# Patient Record
Sex: MALE | Race: BLACK OR AFRICAN AMERICAN | URBAN - METROPOLITAN AREA
[De-identification: names, ages, dates, MRNs, and addresses within clinical notes are randomized per-mention and may not be internally consistent; named-entity substitution may affect disease eponyms.]

---

## 2021-10-25 ENCOUNTER — OFFICE VISIT (OUTPATIENT)
Dept: FAMILY MEDICINE CLINIC | Age: 21
End: 2021-10-25
Payer: COMMERCIAL

## 2021-10-25 VITALS
BODY MASS INDEX: 22.51 KG/M2 | RESPIRATION RATE: 17 BRPM | HEART RATE: 63 BPM | SYSTOLIC BLOOD PRESSURE: 120 MMHG | DIASTOLIC BLOOD PRESSURE: 78 MMHG | HEIGHT: 69 IN | OXYGEN SATURATION: 94 % | TEMPERATURE: 97.9 F | WEIGHT: 152 LBS

## 2021-10-25 DIAGNOSIS — F33.1 MODERATE EPISODE OF RECURRENT MAJOR DEPRESSIVE DISORDER (HCC): ICD-10-CM

## 2021-10-25 DIAGNOSIS — Z28.21 VACCINATION NOT CARRIED OUT BECAUSE OF PATIENT REFUSAL: ICD-10-CM

## 2021-10-25 DIAGNOSIS — Z00.00 ROUTINE HEALTH MAINTENANCE: Primary | ICD-10-CM

## 2021-10-25 PROCEDURE — 99203 OFFICE O/P NEW LOW 30 MIN: CPT | Performed by: STUDENT IN AN ORGANIZED HEALTH CARE EDUCATION/TRAINING PROGRAM

## 2021-10-25 RX ORDER — FLUOXETINE 10 MG/1
10 CAPSULE ORAL DAILY
Qty: 30 CAPSULE | Refills: 3 | Status: SHIPPED | OUTPATIENT
Start: 2021-10-25

## 2021-10-25 SDOH — ECONOMIC STABILITY: FOOD INSECURITY: WITHIN THE PAST 12 MONTHS, YOU WORRIED THAT YOUR FOOD WOULD RUN OUT BEFORE YOU GOT MONEY TO BUY MORE.: NEVER TRUE

## 2021-10-25 SDOH — ECONOMIC STABILITY: FOOD INSECURITY: WITHIN THE PAST 12 MONTHS, THE FOOD YOU BOUGHT JUST DIDN'T LAST AND YOU DIDN'T HAVE MONEY TO GET MORE.: NEVER TRUE

## 2021-10-25 ASSESSMENT — PATIENT HEALTH QUESTIONNAIRE - PHQ9
SUM OF ALL RESPONSES TO PHQ QUESTIONS 1-9: 18
3. TROUBLE FALLING OR STAYING ASLEEP: 3
5. POOR APPETITE OR OVEREATING: 3
7. TROUBLE CONCENTRATING ON THINGS, SUCH AS READING THE NEWSPAPER OR WATCHING TELEVISION: 3
8. MOVING OR SPEAKING SO SLOWLY THAT OTHER PEOPLE COULD HAVE NOTICED. OR THE OPPOSITE, BEING SO FIGETY OR RESTLESS THAT YOU HAVE BEEN MOVING AROUND A LOT MORE THAN USUAL: 1
6. FEELING BAD ABOUT YOURSELF - OR THAT YOU ARE A FAILURE OR HAVE LET YOURSELF OR YOUR FAMILY DOWN: 1
SUM OF ALL RESPONSES TO PHQ QUESTIONS 1-9: 18
4. FEELING TIRED OR HAVING LITTLE ENERGY: 3
9. THOUGHTS THAT YOU WOULD BE BETTER OFF DEAD, OR OF HURTING YOURSELF: 0
SUM OF ALL RESPONSES TO PHQ9 QUESTIONS 1 & 2: 4
SUM OF ALL RESPONSES TO PHQ QUESTIONS 1-9: 18
1. LITTLE INTEREST OR PLEASURE IN DOING THINGS: 2
2. FEELING DOWN, DEPRESSED OR HOPELESS: 2

## 2021-10-25 ASSESSMENT — ENCOUNTER SYMPTOMS
CONSTIPATION: 0
ABDOMINAL PAIN: 0
COUGH: 0
CHEST TIGHTNESS: 0
RHINORRHEA: 0
BACK PAIN: 0
SHORTNESS OF BREATH: 0
EYE PAIN: 0
NAUSEA: 0
DIARRHEA: 0
VOMITING: 0

## 2021-10-25 ASSESSMENT — SOCIAL DETERMINANTS OF HEALTH (SDOH): HOW HARD IS IT FOR YOU TO PAY FOR THE VERY BASICS LIKE FOOD, HOUSING, MEDICAL CARE, AND HEATING?: NOT HARD AT ALL

## 2021-10-25 NOTE — PROGRESS NOTES
10/25/2021    Jessica Sanchez (:  2000) is a 19 y.o. male, here for evaluation of the following medical concerns:    HPI  Chief Complaint   Patient presents with    New Patient     establish care    Anxiety     states last 3 months its been an issue     Patient is a 19year old male here today to establish care. He has a PMH of asthma. He has no known drug allergies. He has no surgeries. He only uses an inhaler as needed for his asthma. He states that HTN, hyperlipidemia, and diabetes runs in his family. He states that his sister has history of depression. Patient does not smoke but he does drink. States he used to drink a lot more than he does now. He states that he will drink about 2 times a week but does not always finish his drinks when he starts. Jessica Sanchez is a 19 y.o. male who presents for new evaluation and treatment of depression. Onset was approximately 1 year ago. Symptoms have been gradually worsening since that time. Current symptoms include: depressed mood, difficulty concentrating, fatigue, feelings of worthlessness/guilt, hopelessness, hypersomnia, insomnia and suicidal thoughts with specific plan. Family history significant for depression. Possible organic causes contributing are: none. Risk factors: positive family history in  sister(s). Previous treatment includes none. He complains of the following side effects from the treatment: none. He attempted suicide twice in the past in 2019. He states that he has had thoughts about what he could do but states that he would not actually go through it. He does not have great support because they are in [place]. Review of Systems   Constitutional: Negative for chills, fatigue and fever. HENT: Negative for congestion, ear pain, postnasal drip and rhinorrhea. Eyes: Negative for pain. Respiratory: Negative for cough, chest tightness and shortness of breath. Cardiovascular: Negative for chest pain. Gastrointestinal: Negative for abdominal pain, constipation, diarrhea, nausea and vomiting. Genitourinary: Negative for difficulty urinating, dysuria and frequency. Musculoskeletal: Negative for back pain and myalgias. Skin: Negative for rash. Neurological: Negative for dizziness, light-headedness, numbness and headaches. Psychiatric/Behavioral: Positive for decreased concentration, dysphoric mood, sleep disturbance and suicidal ideas. The patient is nervous/anxious. Prior to Visit Medications    Medication Sig Taking? Authorizing Provider   FLUoxetine (PROZAC) 10 MG capsule Take 1 capsule by mouth daily Yes Miroslava Oliveira DO        No Known Allergies     Past Medical History:   Diagnosis Date    Asthma        History reviewed. No pertinent surgical history. Social History     Socioeconomic History    Marital status: Single     Spouse name: Not on file    Number of children: Not on file    Years of education: Not on file    Highest education level: Not on file   Occupational History    Not on file   Tobacco Use    Smoking status: Never Smoker    Smokeless tobacco: Former User   Vaping Use    Vaping Use: Former   Substance and Sexual Activity    Alcohol use: Yes    Drug use: Not on file    Sexual activity: Not on file   Other Topics Concern    Not on file   Social History Narrative    Not on file     Social Determinants of Health     Financial Resource Strain: Low Risk     Difficulty of Paying Living Expenses: Not hard at all   Food Insecurity: No Food Insecurity    Worried About Running Out of Food in the Last Year: Never true    [address] in the Last Year: Never true   Transportation Needs:     Lack of Transportation (Medical):      Lack of Transportation (Non-Medical):    Physical Activity:     Days of Exercise per Week:     Minutes of Exercise per Session:    Stress:     Feeling of Stress :    Social Connections:     Frequency of Communication with Friends and Family:     Frequency of Social Gatherings with Friends and Family:     Attends Taoism Services:     Active Member of Clubs or Organizations:     Attends Club or Organization Meetings:     Marital Status:    Intimate Partner Violence:     Fear of Current or Ex-Partner:     Emotionally Abused:     Physically Abused:     Sexually Abused:         Family History   Problem Relation Age of Onset    High Blood Pressure Mother     High Blood Pressure Maternal Grandmother     High Cholesterol Maternal Grandmother     Diabetes Maternal Grandfather     High Cholesterol Maternal Grandfather            Vitals:    10/25/21 1311   BP: 120/78   Site: Left Upper Arm   Position: Sitting   Cuff Size: Medium Adult   Pulse: 63   Resp: 17   Temp: 97.9 °F (36.6 °C)   TempSrc: Temporal   SpO2: 94%   Weight: 152 lb (68.9 kg)   Height: 5' 9\" (1.753 m)     Estimated body mass index is 22.45 kg/m² as calculated from the following:    Height as of this encounter: 5' 9\" (1.753 m). Weight as of this encounter: 152 lb (68.9 kg). Physical Exam  Vitals and nursing note reviewed. Constitutional:       Appearance: Normal appearance. HENT:      Head: Normocephalic and atraumatic. Right Ear: Tympanic membrane, ear canal and external ear normal.      Left Ear: Tympanic membrane, ear canal and external ear normal.      Nose: Nose normal.      Mouth/Throat:      Mouth: Mucous membranes are moist.   Eyes:      Extraocular Movements: Extraocular movements intact. Pupils: Pupils are equal, round, and reactive to light. Cardiovascular:      Rate and Rhythm: Normal rate and regular rhythm. Pulses: Normal pulses. Pulmonary:      Breath sounds: Normal breath sounds. Abdominal:      General: Bowel sounds are normal.      Palpations: Abdomen is soft. Musculoskeletal:         General: Normal range of motion. Cervical back: Normal range of motion and neck supple. Skin:     General: Skin is warm and dry. Capillary Refill: Capillary refill takes less than 2 seconds. Neurological:      General: No focal deficit present. Mental Status: He is alert and oriented to person, place, and time. Psychiatric:         Mood and Affect: Mood is anxious and depressed. Affect is flat. Behavior: Behavior is withdrawn. Thought Content: Thought content includes suicidal ideation. ASSESSMENT/PLAN:  Kristen Azul was seen today for new patient and anxiety. Diagnoses and all orders for this visit:    Routine health maintenance    Moderate episode of recurrent major depressive disorder (HCC)  -     FLUoxetine (PROZAC) 10 MG capsule; Take 1 capsule by mouth daily  -     External Referral To Psychiatry    Vaccination not carried out because of patient refusal     patient states that he has suicidal thoughts but not actively suicidal at this time. States that he has had the thoughts but would never go through with it. Medication will be prescribed and referral for therapy placed. Educational materials and/or home exercises printed for patient's review and were included in patient instructions on his/her After Visit Summary and given to patient at the end of visit. Counseled regarding above diagnosis, including possible risks and complications,  especially if left uncontrolled. Counseled regarding the possible side effects, risks, benefits and alternatives to treatment; patient and/or guardian verbalizes understanding, agrees, feels comfortable with and wishes to proceed with above treatment plan. Advised patient to call with any new medication issues, and read all Rx info from pharmacy to assure aware of all possible risks and side effects of medication before taking. Reviewed age and gender appropriate health screening exams and vaccinations.   Advised patient regarding importance of keeping up with recommended health maintenance and to schedule as soon as possible if overdue, as this is important in assessing for undiagnosed pathology, especially cancer, as well as protecting against potentially harmful/life threatening disease. Patient and/or guardian verbalizes understanding and agrees with above counseling, assessment and plan. All questions answered. Return in about 4 weeks (around 11/22/2021) for anxiety, depression. An  electronic signature was used to authenticate this note.     --Kishore Burr, DO on 10/25/2021 at 2:52 PM

## 2021-11-10 ENCOUNTER — OFFICE VISIT (OUTPATIENT)
Dept: FAMILY MEDICINE CLINIC | Age: 21
End: 2021-11-10
Payer: COMMERCIAL

## 2021-11-10 VITALS
TEMPERATURE: 100.7 F | HEIGHT: 69 IN | HEART RATE: 107 BPM | WEIGHT: 153 LBS | OXYGEN SATURATION: 95 % | SYSTOLIC BLOOD PRESSURE: 124 MMHG | DIASTOLIC BLOOD PRESSURE: 78 MMHG | BODY MASS INDEX: 22.66 KG/M2

## 2021-11-10 DIAGNOSIS — J45.41 MODERATE PERSISTENT ASTHMATIC BRONCHITIS WITH ACUTE EXACERBATION: ICD-10-CM

## 2021-11-10 DIAGNOSIS — R09.81 HEAD CONGESTION: ICD-10-CM

## 2021-11-10 DIAGNOSIS — R05.9 COUGH: ICD-10-CM

## 2021-11-10 DIAGNOSIS — R50.9 FEVER, UNSPECIFIED FEVER CAUSE: Primary | ICD-10-CM

## 2021-11-10 DIAGNOSIS — J01.40 ACUTE NON-RECURRENT PANSINUSITIS: ICD-10-CM

## 2021-11-10 LAB
Lab: NORMAL
PERFORMING INSTRUMENT: NORMAL
QC PASS/FAIL: NORMAL
SARS-COV-2, POC: NORMAL

## 2021-11-10 PROCEDURE — 99214 OFFICE O/P EST MOD 30 MIN: CPT | Performed by: FAMILY MEDICINE

## 2021-11-10 PROCEDURE — 87426 SARSCOV CORONAVIRUS AG IA: CPT | Performed by: FAMILY MEDICINE

## 2021-11-10 RX ORDER — AMOXICILLIN AND CLAVULANATE POTASSIUM 875; 125 MG/1; MG/1
1 TABLET, FILM COATED ORAL 2 TIMES DAILY
Qty: 20 TABLET | Refills: 0 | Status: SHIPPED | OUTPATIENT
Start: 2021-11-10 | End: 2021-11-20

## 2021-11-10 RX ORDER — METHYLPREDNISOLONE 4 MG/1
TABLET ORAL
Qty: 1 KIT | Refills: 0 | Status: SHIPPED | OUTPATIENT
Start: 2021-11-10 | End: 2021-11-16

## 2021-11-10 RX ORDER — BENZONATATE 200 MG/1
200 CAPSULE ORAL 3 TIMES DAILY PRN
Qty: 30 CAPSULE | Refills: 0 | Status: SHIPPED | OUTPATIENT
Start: 2021-11-10 | End: 2021-11-17

## 2021-11-10 ASSESSMENT — ENCOUNTER SYMPTOMS
BLOOD IN STOOL: 0
WHEEZING: 1
COUGH: 1
SINUS PRESSURE: 1
CHEST TIGHTNESS: 0
SHORTNESS OF BREATH: 0
ABDOMINAL PAIN: 0

## 2021-11-10 NOTE — PROGRESS NOTES
11/10/21  [place] : 2000 Sex: male  Age: 19 y.o. Chief Complaint   Patient presents with    Head Congestion     x 2 days / thought it was allegies flaring up / Covid Vaccinated x 2    Cough     productive yellow phlegm yesterday    Wheezing       HPI pleasant 78-year-old gentleman is here today complaining of a cough worsening for 2 days sinus congestion worsening for the last week he just started wheezing in the last day. He has a long history of asthma and is somewhat used to the symptoms happening once or twice a year. We did test him for Covid and that was negative. Review of Systems   Constitutional: Negative for diaphoresis and unexpected weight change. HENT: Positive for congestion and sinus pressure. Eyes: Negative for visual disturbance. Respiratory: Positive for cough and wheezing. Negative for chest tightness and shortness of breath. Cardiovascular: Negative for chest pain and palpitations. Gastrointestinal: Negative for abdominal pain and blood in stool. Genitourinary: Negative for flank pain. Musculoskeletal: Negative for neck pain and neck stiffness. Neurological: Negative for seizures, syncope and speech difficulty. Physical Exam  Constitutional:       Appearance: Normal appearance. HENT:      Head: Normocephalic. Right Ear: Tympanic membrane is bulging. Left Ear: Tympanic membrane is bulging. Nose: Congestion present. Right Sinus: Maxillary sinus tenderness and frontal sinus tenderness present. Left Sinus: Maxillary sinus tenderness and frontal sinus tenderness present. Mouth/Throat:      Mouth: Mucous membranes are moist.   Cardiovascular:      Rate and Rhythm: Normal rate and regular rhythm. Pulses: Normal pulses. Pulmonary:      Breath sounds: Wheezing present. Abdominal:      Palpations: Abdomen is soft. Skin:     General: Skin is warm and dry.       Capillary Refill: Capillary refill takes less than 2 seconds. Neurological:      Mental Status: He is alert and oriented to person, place, and time. Psychiatric:         Mood and Affect: Mood normal.         Behavior: Behavior normal.         Assessment and Plan:  [place] was seen today for head congestion, cough and wheezing. Diagnoses and all orders for this visit:    Fever, unspecified fever cause  -     POCT COVID-19, Antigen    Cough  -     POCT COVID-19, Antigen    Head congestion  -     POCT COVID-19, Antigen    Moderate persistent asthmatic bronchitis with acute exacerbation    Acute non-recurrent pansinusitis    Other orders  -     amoxicillin-clavulanate (AUGMENTIN) 875-125 MG per tablet; Take 1 tablet by mouth 2 times daily for 10 days  -     methylPREDNISolone (MEDROL DOSEPACK) 4 MG tablet; Take by mouth. -     benzonatate (TESSALON) 200 MG capsule; Take 1 capsule by mouth 3 times daily as needed for Cough          Discussions/Education provided to patients during visit:  [] Discussed the importance to stop smoking. [] Advised to monitor eating habits. [] Reviewed and discussed Imaging results. [] Reviewed and discussed Lab results. [] Discussed the importance of drinking plenty of fluids. [] Cut down on Salt, Caffeine, and Sugar. [x] Continue Medications as Discussed. [x] Communicated with patient any concerns, to phone office. No follow-ups on file.       Seen By:  No Sesay DO

## 2021-12-10 ENCOUNTER — TELEPHONE (OUTPATIENT)
Dept: FAMILY MEDICINE CLINIC | Age: 21
End: 2021-12-10

## 2021-12-10 NOTE — TELEPHONE ENCOUNTER
He said he needs a refill on his inhaler sent to [facility] in [place].  Last seen 10/25/2021 as new pt

## 2021-12-13 RX ORDER — ALBUTEROL SULFATE 90 MCG
2 HFA AEROSOL WITH ADAPTER (GRAM) INHALATION EVERY 6 HOURS PRN
Qty: 18 G | Refills: 3
Start: 2021-12-13

## 2022-02-18 ENCOUNTER — TELEPHONE (OUTPATIENT)
Dept: FAMILY MEDICINE CLINIC | Age: 22
End: 2022-02-18